# Patient Record
Sex: FEMALE | Race: WHITE | NOT HISPANIC OR LATINO | Employment: FULL TIME | URBAN - METROPOLITAN AREA
[De-identification: names, ages, dates, MRNs, and addresses within clinical notes are randomized per-mention and may not be internally consistent; named-entity substitution may affect disease eponyms.]

---

## 2021-04-09 NOTE — PROGRESS NOTES
Last Mammo 4/17/2019  Birads  2,  25-50% breast density  Last Dexa-Scan 4/16/2019  Lumbosacral spine-Osteopenic   Left hip-normal

## 2021-04-12 ENCOUNTER — ANNUAL EXAM (OUTPATIENT)
Dept: OBGYN CLINIC | Facility: CLINIC | Age: 71
End: 2021-04-12
Payer: MEDICARE

## 2021-04-12 VITALS
SYSTOLIC BLOOD PRESSURE: 138 MMHG | DIASTOLIC BLOOD PRESSURE: 74 MMHG | BODY MASS INDEX: 27.52 KG/M2 | WEIGHT: 165.2 LBS | TEMPERATURE: 97.3 F | HEIGHT: 65 IN

## 2021-04-12 DIAGNOSIS — Z12.4 ENCOUNTER FOR PAPANICOLAOU SMEAR FOR CERVICAL CANCER SCREENING: ICD-10-CM

## 2021-04-12 DIAGNOSIS — Z12.31 ENCOUNTER FOR SCREENING MAMMOGRAM FOR MALIGNANT NEOPLASM OF BREAST: ICD-10-CM

## 2021-04-12 DIAGNOSIS — M85.88 OSTEOPENIA OF LUMBAR SPINE: ICD-10-CM

## 2021-04-12 DIAGNOSIS — Z01.419 ENCOUNTER FOR GYNECOLOGICAL EXAMINATION WITHOUT ABNORMAL FINDING: Primary | ICD-10-CM

## 2021-04-12 PROCEDURE — G0143 SCR C/V CYTO,THINLAYER,RESCR: HCPCS | Performed by: NURSE PRACTITIONER

## 2021-04-12 PROCEDURE — G0101 CA SCREEN;PELVIC/BREAST EXAM: HCPCS | Performed by: NURSE PRACTITIONER

## 2021-04-12 NOTE — PATIENT INSTRUCTIONS
Calcium 1200-1500mg + 800-1000 IU Vit D daily unless otherwise directed  Avoid falls  Exercise 3 times per week minimum including weight bearing exercises  DEXA scan- minimal osteopenia  PAP every 2 years per medicare can consider stopping after 71 as long as no h/o abn paps  Annual mammogram and monthly breast self exam recommended      Colonoscopy- per GI

## 2021-04-12 NOTE — PROGRESS NOTES
Assessment/Plan:    Calcium 1200-1500mg + 800-1000 IU Vit D daily unless otherwise directed  Avoid falls  Exercise 3 times per week minimum including weight bearing exercises  DEXA scan- minimal osteopenia  PAP every 2 years per medicare can consider stopping after 71 as long as no h/o abn paps  Annual mammogram and monthly breast self exam recommended   Colonoscopy- per GI  Diagnoses and all orders for this visit:    Encounter for screening mammogram for malignant neoplasm of breast  -     Mammo screening bilateral w 3d & cad; Future    Encounter for gynecological examination without abnormal finding    Osteopenia of lumbar spine          Subjective:      Patient ID: Jose Guadalupe Gar is a 72 y o  female  She present for medicare gyn wellness She denies vaginal bleeding  Denies abdominal or pelvic pain  Bowel and bladder are normal   Nocturia x 1-2  NO incont  Not sexually active  PAP 4/2019 neg  No h/o abn pap Dexa 2019 minimal osteopenia spine  Hip normal   No supplement currently  Cheese eater and milk in coffee  Mammo Last 2 years ago  Colonoscopy not for awhile  Normal when had no polyps  The following portions of the patient's history were reviewed and updated as appropriate: allergies, current medications, past family history, past medical history, past social history, past surgical history and problem list     Review of Systems   Constitutional: Negative for activity change, appetite change, chills, diaphoresis, fatigue, fever and unexpected weight change  Eyes: Negative for visual disturbance  Respiratory: Negative for cough, chest tightness and shortness of breath  Cardiovascular: Negative for chest pain and palpitations  Gastrointestinal: Negative for abdominal distention, abdominal pain, constipation, diarrhea, nausea and vomiting     Genitourinary: Negative for difficulty urinating, dyspareunia, dysuria, frequency, genital sores, menstrual problem, pelvic pain, urgency, vaginal bleeding, vaginal discharge and vaginal pain  Neurological: Negative for light-headedness and headaches  Hematological: Negative for adenopathy  Psychiatric/Behavioral: Negative  Objective:      /74   Temp (!) 97 3 °F (36 3 °C)   Ht 5' 5 25" (1 657 m)   Wt 74 9 kg (165 lb 3 2 oz)   Breastfeeding No   BMI 27 28 kg/m²          Physical Exam  Vitals signs and nursing note reviewed  Constitutional:       General: She is not in acute distress  Appearance: Normal appearance  HENT:      Head: Normocephalic and atraumatic  Cardiovascular:      Rate and Rhythm: Normal rate and regular rhythm  Pulmonary:      Effort: Pulmonary effort is normal       Breath sounds: Normal breath sounds  Chest:      Breasts: Breasts are symmetrical          Right: Normal          Left: Normal    Abdominal:      General: There is no distension  Palpations: Abdomen is soft  Tenderness: There is no abdominal tenderness  There is no guarding or rebound  Genitourinary:     General: Normal vulva  Exam position: Lithotomy position  Labia:         Right: No rash, tenderness, lesion or injury  Left: No rash, tenderness, lesion or injury  Urethra: No prolapse, urethral pain, urethral swelling or urethral lesion  Vagina: Normal       Cervix: Normal       Uterus: Normal        Adnexa: Right adnexa normal and left adnexa normal       Rectum: No external hemorrhoid  Comments: PAP performed  Musculoskeletal: Normal range of motion  Lymphadenopathy:      Upper Body:      Right upper body: No axillary adenopathy  Left upper body: No axillary adenopathy  Lower Body: No right inguinal adenopathy  No left inguinal adenopathy  Skin:     General: Skin is warm and dry  Neurological:      Mental Status: She is alert and oriented to person, place, and time     Psychiatric:         Mood and Affect: Mood normal          Behavior: Behavior normal          Thought Content: Thought content normal          Judgment: Judgment normal

## 2021-04-15 LAB
LAB AP GYN PRIMARY INTERPRETATION: NORMAL
Lab: NORMAL

## 2021-08-31 ENCOUNTER — VBI (OUTPATIENT)
Dept: ADMINISTRATIVE | Facility: OTHER | Age: 71
End: 2021-08-31

## 2021-08-31 NOTE — TELEPHONE ENCOUNTER
Upon review of the documents received were able to locate, review, and update the patient chart as requested for DEXA Scan and Mammogram     Any additional questions or concerns should be emailed to the Practice Liaisons via [email or web address] email, please do not reply via In Basket      Thank you  Ronaldo Mireles

## 2023-11-06 ENCOUNTER — ANNUAL EXAM (OUTPATIENT)
Dept: OBGYN CLINIC | Facility: CLINIC | Age: 73
End: 2023-11-06
Payer: MEDICARE

## 2023-11-06 VITALS
HEIGHT: 65 IN | WEIGHT: 166 LBS | DIASTOLIC BLOOD PRESSURE: 78 MMHG | SYSTOLIC BLOOD PRESSURE: 142 MMHG | BODY MASS INDEX: 27.66 KG/M2

## 2023-11-06 DIAGNOSIS — Z12.4 ENCOUNTER FOR PAPANICOLAOU SMEAR FOR CERVICAL CANCER SCREENING: ICD-10-CM

## 2023-11-06 DIAGNOSIS — Z12.31 ENCOUNTER FOR SCREENING MAMMOGRAM FOR MALIGNANT NEOPLASM OF BREAST: ICD-10-CM

## 2023-11-06 DIAGNOSIS — Z01.411 ENCOUNTER FOR GYNECOLOGICAL EXAMINATION WITH ABNORMAL FINDING: Primary | ICD-10-CM

## 2023-11-06 PROCEDURE — G0101 CA SCREEN;PELVIC/BREAST EXAM: HCPCS | Performed by: NURSE PRACTITIONER

## 2023-11-06 NOTE — PATIENT INSTRUCTIONS
Calcium 1200-1500mg + 800-1000 IU Vit D daily unless otherwise directed. Avoid falls. Exercise 150 minutes per week minimum including weight bearing exercises. No further paps after age 72 as long as there has been adequate normal paps completed, no history of ANITA 2  or a more severe pap diagnosis in the last 20 years. Annual mammogram and monthly breast self exam recommended .    Colonoscopy- as recommended

## 2023-11-06 NOTE — PROGRESS NOTES
Assessment/Plan:         Diagnoses and all orders for this visit:    Encounter for gynecological examination with abnormal finding    Encounter for screening mammogram for malignant neoplasm of breast  -     Mammo screening bilateral w 3d & cad; Future          Subjective:      Patient ID: Timothy Mckenzie is a 68 y.o. female. Here for medicare gyn wellness She denies vaginal bleeding. Denies abdominal or pelvic pain. Bowel and bladder are normal.  Nocturia x 1-2. NO incont. Not sexually active. PAP 4/2021 neg prior 4/2019 neg. No h/o abn pap Dexa 2019 minimal osteopenia spine. Hip normal.  No supplement currently. Colonoscopy not for awhile. 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    Review of Systems   Constitutional:  Negative for fatigue and unexpected weight change. Gastrointestinal:  Negative for abdominal distention, abdominal pain, constipation and diarrhea. Genitourinary:  Negative for difficulty urinating, dyspareunia, dysuria, frequency, genital sores, menstrual problem, pelvic pain, urgency, vaginal bleeding, vaginal discharge and vaginal pain. Neurological:  Negative for headaches. Psychiatric/Behavioral: Negative. Negative for dysphoric mood. The patient is not nervous/anxious. Objective:      /78   Ht 5' 5" (1.651 m)   Wt 75.3 kg (166 lb)   Breastfeeding No   BMI 27.62 kg/m²          Physical Exam  Vitals and nursing note reviewed. Constitutional:       General: She is not in acute distress. Appearance: Normal appearance. HENT:      Head: Normocephalic and atraumatic.    Pulmonary:      Effort: Pulmonary effort is normal.   Chest:   Breasts:     Breasts are symmetrical.      Right: Normal. No mass, nipple discharge, skin change or tenderness. Left: Normal. No mass, nipple discharge, skin change or tenderness. Abdominal:      General: There is no distension. Palpations: Abdomen is soft. Tenderness: There is no abdominal tenderness. There is no guarding or rebound. Genitourinary:     General: Normal vulva. Exam position: Lithotomy position. Labia:         Right: No rash, tenderness, lesion or injury. Left: No rash, tenderness, lesion or injury. Urethra: No prolapse, urethral pain, urethral swelling or urethral lesion. Vagina: Normal. No erythema or lesions. Cervix: No cervical motion tenderness, discharge, lesion or cervical bleeding. Uterus: Normal.       Adnexa: Right adnexa normal and left adnexa normal.        Right: No mass or tenderness. Left: No mass or tenderness. Rectum: No mass or external hemorrhoid. Comments: Atrophy PAP from cervix + cystocele  Musculoskeletal:         General: Normal range of motion. Lymphadenopathy:      Upper Body:      Right upper body: No axillary adenopathy. Left upper body: No axillary adenopathy. Lower Body: No right inguinal adenopathy. No left inguinal adenopathy. Skin:     General: Skin is warm and dry. Neurological:      Mental Status: She is alert and oriented to person, place, and time. Psychiatric:         Mood and Affect: Mood normal.         Behavior: Behavior normal.         Thought Content:  Thought content normal.         Judgment: Judgment normal.

## 2023-11-10 LAB
CLINICAL INFO: NORMAL
CYTO CVX: NORMAL
CYTOLOGY CMNT CVX/VAG CYTO-IMP: NORMAL
DATE PREVIOUS BX: NORMAL
LMP START DATE: NORMAL
SL AMB PREV. PAP:: NORMAL
SPECIMEN SOURCE CVX/VAG CYTO: NORMAL

## 2024-01-11 ENCOUNTER — VBI (OUTPATIENT)
Dept: ADMINISTRATIVE | Facility: OTHER | Age: 74
End: 2024-01-11

## 2024-01-11 NOTE — TELEPHONE ENCOUNTER
Upon review of the In Basket request we were able to locate, review, and update the patient chart as requested for Mammogram.    Any additional questions or concerns should be emailed to the Practice Liaisons via the appropriate education email address, please do not reply via In Basket.    Thank you  Merna Marshall

## 2025-08-19 ENCOUNTER — VBI (OUTPATIENT)
Dept: ADMINISTRATIVE | Facility: OTHER | Age: 75
End: 2025-08-19

## 2025-08-26 PROBLEM — M17.12 UNILATERAL PRIMARY OSTEOARTHRITIS, LEFT KNEE: Status: ACTIVE | Noted: 2025-08-26

## 2025-08-26 PROBLEM — M87.9 OSTEONECROSIS OF LEFT KNEE REGION (HCC): Status: ACTIVE | Noted: 2025-08-26
